# Patient Record
(demographics unavailable — no encounter records)

---

## 2024-10-24 NOTE — HISTORY OF PRESENT ILLNESS
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  She is under great stress.  Her 's brother and his 2 nephews have displayed poor behavior in terms of inheritance, so that has added to her stress.  She is on spironolactone 50 mg daily and chlorthalidone 25 mg daily, after developing hair loss on ARB's.  The spironolactone is 50 mg/day and chlorthalidone 12.5 mg daily.  Her K was 5.3 with a creatinine of 0.96, BUN 19, and GFR of 62 in July.  She has been checking her home BP, and it tends to run anywhere from 1 17-1 40, averaging about 130.

## 2024-10-24 NOTE — ASSESSMENT
[FreeTextEntry1] : 74-year-old woman with a history of hypertension, which appears to be in relatively good control.  Her BP here today was 136/85 initially, but fell to 118/79 after deep breathing.  Her home readings have been running about an average of 130 systolic.  In the regard to her hyperkalemia, I am ordering a BMP and plasma potassium, and if her K is truly elevated, we will likely need to adjust the dose of her spironolactone.  She will return in 4 months.

## 2025-02-25 NOTE — HISTORY OF PRESENT ILLNESS
[FreeTextEntry1] : 74-year-old woman with a long history of hypertension, who has been under good extreme stress in recent months, having lost her  and her mother in close proximity.  She is also the executor of both [employer].  She has been on spironolactone 50 mg daily and chlorthalidone 12.5 mg daily, after developing hair loss on ARB's.  She recently saw Dr. Miller Mills's PA, for dermatology consultation, and she started minoxidil 1.25 mg daily.  She developed palpitations, which have actually persisted after 3 weeks off the drug.  That raises the question of whether it is caused by something else.  So she saw Dr. Will Medina yesterday, and had chest x-ray and labs.  She also had an MRI of the breast last week with gadolinium and they checked her labs before hand.  She was told that her creatinine was 1.2.  However it has never been that high in the past.  And was 0.91 with a BUN of 14 back in October when I checked it.  Her K was 4.5, CO2 24, calcium 9.9, glucose 98.  She checks her BP at home, and has found it to be generally 120-136/76-88.  Her BP here today was initially 126/77, and fell to 116/75 after deep breathing.

## 2025-02-25 NOTE — ASSESSMENT
[FreeTextEntry1] : 74-year-old woman with a long history of hypertension, which is seemingly in good control on her current regimen.  2 questions have emerged : 1.  What is the cause of the palpitations?  Minoxidil at 1.25 mg does not usually do this, and the fact that they have persisted 3 weeks after stopping the drug has raised the question of whether there is an additional cardiac etiology, or simply stress related.     2.  She was told that her creatinine was 1.2 last week, which is distinctly higher than her usual 0.9, so I have ordered BMP, Cystatin C, urinalysis with microscopic, urine microalbumin.  If her GFR is diminished, I will likely order a renal ultrasound to assess kidney size, echogenicity, and rule out obstructive uropathy.  If the creatinine of 1.2 is real, another possibility is volume depletion since she is on 2 diuretics technically.  She will return in 4 months.  Time spent 45 minutes

## 2025-02-25 NOTE — CONSULT LETTER
[Dear  ___] : Dear  [unfilled], [Please see my note below.] : Please see my note below. [Consult Closing:] : Thank you very much for allowing me to participate in the care of this patient.  If you have any questions, please do not hesitate to contact me. [Sincerely,] : Sincerely, [FreeTextEntry2] : Dr Miller Mills - [place] [FreeTextEntry1] : Stephen Bhatt -I hope all is well with you and your family.  All the best, Steve [FreeTextEntry3] : Sincerely,   Sin Aggarwal MD, FACP [DrBreana  ___] : Dr. HERNANDEZ

## 2025-06-23 NOTE — HISTORY OF PRESENT ILLNESS
[FreeTextEntry1] : 74-year-old woman with a long history of hypertension.  She has been on spironolactone 50 mg daily and chlorthalidone 12.5 mg daily after developing hair loss on irbesartan and wanting to stop it.  She developed extremely high blood pressures in early April, and went to the ER at [facility].  Her BP initially was in the 205 systolic range.  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  She was mildly hyponatremic then with a sodium of 129, but that is now up to 136.  She does have prerenal azotemia with a creatinine of 0.87 but a BUN of 40, and a GFR of 70.  Her hemoglobin is 14.9 and her white count in April was 13.7 with no particular explanation.  Her potassium has been normal and was 4.9 in April.  Her excellent PCP is Dr. Terry Petersen and her cardiologist is Dr. Will Medina.  She feels relatively well and denies leg cramps.  Her home blood pressures have been running 128-146/76-86.  However her BP here today is 122/70.

## 2025-06-23 NOTE — ASSESSMENT
[FreeTextEntry1] : 74-year-old woman with good BP control, but prerenal azotemia.  We may need to consider reducing her diuretic dose.  2 of her labs concern me, namely the elevated BUN and white count.  She had labs done recently and will send me those results.